# Patient Record
Sex: FEMALE | Race: WHITE
[De-identification: names, ages, dates, MRNs, and addresses within clinical notes are randomized per-mention and may not be internally consistent; named-entity substitution may affect disease eponyms.]

---

## 2021-05-13 ENCOUNTER — HOSPITAL ENCOUNTER (INPATIENT)
Dept: HOSPITAL 47 - EC | Age: 76
LOS: 1 days | Discharge: NURSING, REHAB OR LONG-TERM CARE | DRG: 563 | End: 2021-05-14
Attending: ORTHOPAEDIC SURGERY | Admitting: ORTHOPAEDIC SURGERY
Payer: MEDICARE

## 2021-05-13 DIAGNOSIS — Z98.41: ICD-10-CM

## 2021-05-13 DIAGNOSIS — Z79.1: ICD-10-CM

## 2021-05-13 DIAGNOSIS — G47.30: ICD-10-CM

## 2021-05-13 DIAGNOSIS — E03.9: ICD-10-CM

## 2021-05-13 DIAGNOSIS — I10: ICD-10-CM

## 2021-05-13 DIAGNOSIS — Z90.710: ICD-10-CM

## 2021-05-13 DIAGNOSIS — E66.01: ICD-10-CM

## 2021-05-13 DIAGNOSIS — Z87.891: ICD-10-CM

## 2021-05-13 DIAGNOSIS — Z98.42: ICD-10-CM

## 2021-05-13 DIAGNOSIS — Z87.442: ICD-10-CM

## 2021-05-13 DIAGNOSIS — Z20.822: ICD-10-CM

## 2021-05-13 DIAGNOSIS — S42.301A: Primary | ICD-10-CM

## 2021-05-13 DIAGNOSIS — E89.2: ICD-10-CM

## 2021-05-13 DIAGNOSIS — M19.90: ICD-10-CM

## 2021-05-13 DIAGNOSIS — Z79.890: ICD-10-CM

## 2021-05-13 DIAGNOSIS — E78.5: ICD-10-CM

## 2021-05-13 DIAGNOSIS — M06.9: ICD-10-CM

## 2021-05-13 DIAGNOSIS — Z96.651: ICD-10-CM

## 2021-05-13 DIAGNOSIS — Z90.49: ICD-10-CM

## 2021-05-13 DIAGNOSIS — Z79.899: ICD-10-CM
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
## 2021-05-13 DIAGNOSIS — Z79.82: ICD-10-CM

## 2021-05-13 PROCEDURE — 87635 SARS-COV-2 COVID-19 AMP PRB: CPT

## 2021-05-13 PROCEDURE — 96375 TX/PRO/DX INJ NEW DRUG ADDON: CPT

## 2021-05-13 PROCEDURE — 96376 TX/PRO/DX INJ SAME DRUG ADON: CPT

## 2021-05-13 PROCEDURE — 96374 THER/PROPH/DIAG INJ IV PUSH: CPT

## 2021-05-13 PROCEDURE — 99285 EMERGENCY DEPT VISIT HI MDM: CPT

## 2021-05-13 PROCEDURE — 94660 CPAP INITIATION&MGMT: CPT

## 2021-05-13 RX ADMIN — GABAPENTIN SCH MG: 300 CAPSULE ORAL at 20:03

## 2021-05-13 NOTE — ED
General Adult HPI





- General


Chief complaint: Extremity Injury, Upper


Stated complaint: +COVID Broken R Arm


Time Seen by Provider: 05/13/21 12:40


Source: patient, EMS, RN notes reviewed, old records reviewed


Mode of arrival: EMS





- History of Present Illness


Initial comments: 





This is a 75-year-old female who presents emergency Department from [place]

with a midshaft right humerus fracture.  The orthopedic surgeon there stated he 

could do surgery until Monday and thought the patient needed intervention so 

they sent the patient to our emergency department.  Patient states she was 

outside and tripped and fell and broke her arm patient states at that time she 

did not hit her head or neck patient denies any headache or neck pain.  Patient 

denies numbness weakness per patient denies any back pain chest pain L petitions

shortness of breath per patient denies any abdominal pain patient states she has

no other complaints besides the arm pain.





- Related Data


                                Home Medications











 Medication  Instructions  Recorded  Confirmed


 


Aspirin 81 mg PO DAILY 09/30/14 05/13/21


 


Benazepril [Lotensin] 10 mg PO DAILY 09/30/14 05/13/21


 


Levothyroxine Sodium [Synthroid] 125 mcg PO DAILY 09/30/14 05/13/21


 


Meloxicam 15 mg PO DAILY 09/30/14 05/13/21


 


Omeprazole [PriLOSEC] 20 mg PO DAILY 09/30/14 05/13/21


 


Potassium Chloride [Klor-Con 10] 10 meq PO DAILY 09/30/14 05/13/21


 


atenoloL [Tenormin] 25 mg PO BID 09/30/14 05/13/21


 


hydroCHLOROthiazide [Hydrodiuril] 12.5 mg PO DAILY 09/30/14 05/13/21


 


sulfaSALAzine [Azulfidine] 500 mg PO BID 09/30/14 05/13/21


 


Atorvastatin [Lipitor] 40 mg PO HS 05/13/21 05/13/21


 


Calcium Carbonate [Calcium] 600 mg PO BID 05/13/21 05/13/21


 


Gabapentin 600 mg PO TID 05/13/21 05/13/21


 


Montelukast [Singulair] 10 mg PO HS 05/13/21 05/13/21


 


Multivitamins, Thera [Multivitamin 1 tab PO DAILY 05/13/21 05/13/21





(formulary)]   


 


PARoxetine HCL [Paxil] 40 mg PO DAILY 05/13/21 05/13/21


 


amLODIPine [Norvasc] 10 mg PO DAILY 05/13/21 05/13/21


 


hydrALAZINE HCL [Apresoline] 25 mg PO TID 05/13/21 05/13/21











                                    Allergies











Allergy/AdvReac Type Severity Reaction Status Date / Time


 


No Known Allergies Allergy   Verified 05/13/21 13:54














Review of Systems


ROS Statement: 


Those systems with pertinent positive or pertinent negative responses have been 

documented in the HPI.





ROS Other: All systems not noted in ROS Statement are negative.





Past Medical History


Past Medical History: GERD/Reflux, Hyperlipidemia, Hypertension, Rheumatoid 

Arthritis (RA), Sleep Apnea/CPAP/BIPAP, Thyroid Disorder


Additional Past Medical History / Comment(s): CPAP @ 17 L. RENAL CALCULI.


History of Any Multi-Drug Resistant Organisms: None Reported


Past Surgical History: Cholecystectomy, Heart Catheterization, Hysterectomy, 

Joint Replacement, Orthopedic Surgery


Additional Past Surgical History / Comment(s): LEFT PAROTIDECTOMY. ORIF RIGHT 

RIGHT TOTAL KNEE. OLY. CATARACTS. CYSTO/RENAL CALCULI.


Past Anesthesia/Blood Transfusion Reactions: Motion Sickness, Postoperative 

Nausea & Vomiting (PONV)


Past Psychological History: No Psychological Hx Reported


Smoking Status: Former smoker


Past Alcohol Use History: Rare


Past Drug Use History: None Reported





- Past Family History


  ** Mother


Family Medical History: Cancer





General Exam





- General Exam Comments


Initial Comments: 





GENERAL:


Patient is well-developed and well-nourished.  Patient is nontoxic and well-

hydrated and is in mild distress.





ENT:


Neck is soft and supple.  No significant lymphadenopathy is noted.  Oropharynx 

is clear.  Moist mucous membranes.  Neck has full range of motion without 

eliciting any pain





EYES:


The sclera were anicteric and conjunctiva were pink and moist.  Extraocular 

movements were intact and pupils were equal round and reactive to light.  

Eyelids were unremarkable.





PULMONARY:


Unlabored respirations.  Good breath sounds bilaterally.  No audible rales 

rhonchi or wheezing was noted.





CARDIOVASCULAR:


There is a regular rate and rhythm without any murmurs gallops or rubs.





ABDOMEN:


Soft and nontender with normal bowel sounds. 





SKIN:


Skin is clear with no lesions or rashes and otherwise unremarkable.





NEUROLOGIC:


Patient is alert and oriented x3.  Cranial nerves II through XII are grossly 

intact.  Motor and sensory are also intact.  Normal speech, volume and content. 

 Symmetrical smile.  





MUSCULOSKELETAL:


Patient is unable to move the right arm she does have a splint in place it is a 

upper arm sugar tong splint.





LYMPHATICS:


No significant lymphadenopathy is noted





PSYCHIATRIC:


Normal psychiatric evaluation.





Course


                                   Vital Signs











  05/13/21 05/13/21





  12:37 16:38


 


Temperature 98.2 F 


 


Pulse Rate 67 70


 


Respiratory 18 18





Rate  


 


Blood Pressure 153/69 147/77


 


O2 Sat by Pulse 96 92 L





Oximetry  














Medical Decision Making





- Medical Decision Making





Patient's labs were reviewed and I reviewed the x-rays that showed a mid shaft 

fracture of the right humerus with angulation and displacement of 50%.  [facility] 

[facility] she came in and saw the patient placed the 

patient a cast and will be admitting the patient overnight.





- Lab Data


                                   Lab Results











  05/13/21 Range/Units





  13:48 


 


Coronavirus (PCR)  Not Detected  (Not Detectd)  














Disposition


Clinical Impression: 


 Right humeral fracture, Inadequate pain control





Disposition: ADMITTED AS IP TO THIS HOSP


Referrals: 


Mayco Orr MD [Primary Care Provider] - 1-2 days


Time of Disposition: 17:31

## 2021-05-13 NOTE — P.HPOR
History of Present Illness


H&P Date: 05/13/21


Chief Complaint: Right arm pain


This is a 75-year-old female who was transferred from [facility] 

today with a midshaft humerus fracture.  The patient reportedly tested positive 

for Covid in [place] and was told she would need surgery but they would be 

unable to perform the surgery.  She was transferred to [facility] today where she tested negative for Covid.  She reportedly had a fall 

yesterday and sustained injury to her right arm.  We were consulted for 

orthopedic evaluation.








Past Medical History


Past Medical History: GERD/Reflux, Hyperlipidemia, Hypertension, Rheumatoid 

Arthritis (RA), Sleep Apnea/CPAP/BIPAP, Thyroid Disorder


Additional Past Medical History / Comment(s): CPAP @ 17 L. RENAL CALCULI.


History of Any Multi-Drug Resistant Organisms: None Reported


Past Surgical History: Cholecystectomy, Heart Catheterization, Hysterectomy, 

Joint Replacement, Orthopedic Surgery


Additional Past Surgical History / Comment(s): LEFT PAROTIDECTOMY. ORIF RIGHT 

RIGHT TOTAL KNEE. OLY. CATARACTS. CYSTO/RENAL CALCULI.


Past Anesthesia/Blood Transfusion Reactions: Motion Sickness, Postoperative 

Nausea & Vomiting (PONV)


Past Psychological History: No Psychological Hx Reported


Smoking Status: Former smoker


Past Alcohol Use History: Rare


Past Drug Use History: None Reported





- Past Family History


  ** Mother


Family Medical History: Cancer





Medications and Allergies


                                Home Medications











 Medication  Instructions  Recorded  Confirmed  Type


 


Aspirin 81 mg PO DAILY 09/30/14 05/13/21 History


 


Benazepril [Lotensin] 10 mg PO DAILY 09/30/14 05/13/21 History


 


Levothyroxine Sodium [Synthroid] 125 mcg PO DAILY 09/30/14 05/13/21 History


 


Meloxicam 15 mg PO DAILY 09/30/14 05/13/21 History


 


Omeprazole [PriLOSEC] 20 mg PO DAILY 09/30/14 05/13/21 History


 


Potassium Chloride [Klor-Con 10] 10 meq PO DAILY 09/30/14 05/13/21 History


 


atenoloL [Tenormin] 25 mg PO BID 09/30/14 05/13/21 History


 


hydroCHLOROthiazide [Hydrodiuril] 12.5 mg PO DAILY 09/30/14 05/13/21 History


 


sulfaSALAzine [Azulfidine] 500 mg PO BID 09/30/14 05/13/21 History


 


Atorvastatin [Lipitor] 40 mg PO HS 05/13/21 05/13/21 History


 


Calcium Carbonate [Calcium] 600 mg PO BID 05/13/21 05/13/21 History


 


Gabapentin 600 mg PO TID 05/13/21 05/13/21 History


 


Montelukast [Singulair] 10 mg PO HS 05/13/21 05/13/21 History


 


Multivitamins, Thera [Multivitamin 1 tab PO DAILY 05/13/21 05/13/21 History





(formulary)]    


 


PARoxetine HCL [Paxil] 40 mg PO DAILY 05/13/21 05/13/21 History


 


amLODIPine [Norvasc] 10 mg PO DAILY 05/13/21 05/13/21 History


 


hydrALAZINE HCL [Apresoline] 25 mg PO TID 05/13/21 05/13/21 History








                                    Allergies











Allergy/AdvReac Type Severity Reaction Status Date / Time


 


No Known Allergies Allergy   Verified 05/13/21 13:54














Physical Examination


This is a 75-year-old morbidly obese female in no acute distress.  She is alert 

and oriented at this time.  She is very apprehensive of any motion to the right 

arm.  There is a coaptation splint in place.  Exam of the head and neck reveal 

no obvious deformity.  No pain with palpation about the cervical spine or 

paraspinal musculature.


Exam of the right upper extremity reveals significant pain with any motion of 

the arm.  She has full wrist motion without difficulty or pain.  Neurovascular 

status of her extremity is intact.


The remainder of her musculoskeletal exam is unremarkable.








Results





X-rays of the right arm revealed a midshaft transverse humerus fracture with 

mild displacement.  No other fractures identified.





Assessment and Plan


(1) Morbid obesity


Current Visit: Yes   Status: Acute   Code(s): E66.01 - MORBID (SEVERE) OBESITY 

DUE TO EXCESS CALORIES   SNOMED Code(s): 119918640


   





(2) Right humeral fracture


Current Visit: Yes   Status: Acute   Code(s): S42.301A - UNSP FRACTURE OF SHAFT 

OF HUMERUS, RIGHT ARM, INIT   SNOMED Code(s): 41245143


   


Plan: 


The clinical and x-ray findings are discussed with the patient.  Surgical versus

 conservative treatment options are discussed.  It is recommended she be treated

 with a hanging arm cast.  She is to remain at 45 elevated when in bed to allow

 the arm to hang.  We will keep her inpatient overnight for pain management.  We

 will follow-up with x-rays in 1 week.

## 2021-05-13 NOTE — P.CONS
History of Present Illness





- Reason for Consult


Consult date: 05/13/21


HTN


Requesting physician: Jesus Caldera





- Chief Complaint


Right arm pain





- History of Present Illness


Patient is a 75-year-old female with a past medical history of rheumatoid 

arthritis on sulfasalazine, hypothyroidism, resistant hypertension, and GERD who

presented as a transfer from [place] secondary to humeral fracture.  She 

was admitted to orthopedics.  We are consulted for medical management.  Plan is 

for nonoperative management with a hanging cast.





Patient seen and examined at bedside.  She states that her pain control is much 

better.  She states that the ketamine helped relieve her pain downstairs.  She 

denies any current nausea, vomiting.  She denies any recent cough, cold, fever, 

flu.  She did test positive for COVID at [facility] however tested 

negative here.  She has been asymptomatic without any recent illness and has 

received both of her vaccinations.  She was at home and does not use any 

assistive devices.





She reports that she had a trip and fall accident her son's driveway that 

resulted in her humeral fracture.  She denied any loss of consciousness, 

seizure-like activity, or presyncope.





Pertinent positives and negatives as discussed in HPI, a complete review of 

systems was performed and all other systems are negative.





General: non toxic, no distress, appears at stated age, obese


Derm:  warm, dry


Head: atraumatic, normocephalic, symmetric


Eyes: EOMI, no lid lag, anicteric sclera, pupils equal round reactive to light


ENT: Nose and ears atraumatic, no thrush,  no pharyngeal erythema


Neck: No thyromegaly, no cervical lymphadenopathy, trachea midline, supple


Mouth: no lip lesion, mucus membranes moist


Cardiovascular: S1S2 reg, no murmur, positive posterior tibial pulse bilateral, 

no edema, capillary refill less than 2 seconds


Lungs: Decreased bs bilateral, no ronchi, no rales, no wheeze, no accessory 

muscle use


Abdominal: soft,  nontender to palpation, no guarding, no appreciable 

organomegaly, normal bowel sounds


Ext: no gross muscle atrophy, Right arm in cast, moving fingers, moving b/l LE 

and left arm independently.  


Neuro:  CN II-XI grossly intact, light touch intact all 4 extremities, finger to

nose within normal limits,


Psych: Alert, oriented, appropriate affect





Patient is a 75-year-old female with humeral fracture plan is for nonoperative 

management, home in a.m. if pain is controlled.





Arthritis


-Continue sulfasalazine


-Continue with multivitamins orthopedics is in agreement





Hypertension


-Home medications resumed


-Follow blood pressures





Dyslipidemia


-Statin





Hypothyroidism


-Synthroid





Obesity with BMI 41.2


Structured outpatient weight loss 





Thank you for allowing us to participate in the care of this pleasant patient.  

Do not hesitate to contact us with questions.  Someone can be reached from the 

[facility] hospitalist group all hours of the day at [phone] or via 

perfect serve.








Past Medical History


Past Medical History: GERD/Reflux, Hyperlipidemia, Hypertension, Rheumatoid 

Arthritis (RA), Sleep Apnea/CPAP/BIPAP, Thyroid Disorder


Additional Past Medical History / Comment(s): CPAP @ 17 L. RENAL CALCULI.


History of Any Multi-Drug Resistant Organisms: None Reported


Past Surgical History: Cholecystectomy, Heart Catheterization, Hysterectomy, 

Joint Replacement, Orthopedic Surgery


Additional Past Surgical History / Comment(s): LEFT PAROTIDECTOMY. ORIF RIGHT 

RIGHT TOTAL KNEE. OLY. CATARACTS. CYSTO/RENAL CALCULI- multiple


Past Anesthesia/Blood Transfusion Reactions: Motion Sickness, Postoperative 

Nausea & Vomiting (PONV)


Past Psychological History: No Psychological Hx Reported


Additional Psychological History / Comment(s): TAKES PAXIL FOR SLEEP APNEA


Smoking Status: Former smoker


Past Alcohol Use History: Rare


Past Drug Use History: None Reported





- Past Family History


  ** Mother


Family Medical History: Cancer





Medications and Allergies


                                Home Medications











 Medication  Instructions  Recorded  Confirmed  Type


 


Aspirin 81 mg PO DAILY 09/30/14 05/13/21 History


 


Benazepril [Lotensin] 10 mg PO DAILY 09/30/14 05/13/21 History


 


Levothyroxine Sodium [Synthroid] 125 mcg PO DAILY 09/30/14 05/13/21 History


 


Meloxicam 15 mg PO DAILY 09/30/14 05/13/21 History


 


Omeprazole [PriLOSEC] 20 mg PO DAILY 09/30/14 05/13/21 History


 


Potassium Chloride [Klor-Con 10] 10 meq PO DAILY 09/30/14 05/13/21 History


 


atenoloL [Tenormin] 25 mg PO BID 09/30/14 05/13/21 History


 


hydroCHLOROthiazide [Hydrodiuril] 12.5 mg PO DAILY 09/30/14 05/13/21 History


 


sulfaSALAzine [Azulfidine] 500 mg PO BID 09/30/14 05/13/21 History


 


Atorvastatin [Lipitor] 40 mg PO HS 05/13/21 05/13/21 History


 


Calcium Carbonate [Calcium] 600 mg PO BID 05/13/21 05/13/21 History


 


Gabapentin 600 mg PO TID 05/13/21 05/13/21 History


 


Montelukast [Singulair] 10 mg PO HS 05/13/21 05/13/21 History


 


Multivitamins, Thera [Multivitamin 1 tab PO DAILY 05/13/21 05/13/21 History





(formulary)]    


 


PARoxetine HCL [Paxil] 40 mg PO DAILY 05/13/21 05/13/21 History


 


amLODIPine [Norvasc] 10 mg PO DAILY 05/13/21 05/13/21 History


 


hydrALAZINE HCL [Apresoline] 25 mg PO TID 05/13/21 05/13/21 History








                                    Allergies











Allergy/AdvReac Type Severity Reaction Status Date / Time


 


No Known Allergies Allergy   Verified 05/13/21 13:54














Physical Exam


Osteopathic Statement: *.  No significant issues noted on an osteopathic 

structural exam other than those noted in the History and Physical/Consult.


Vitals: 


                                   Vital Signs











  Temp Pulse Pulse Resp BP BP Pulse Ox


 


 05/13/21 18:52  98.2 F   65  15   175/84  90 L


 


 05/13/21 16:38   70   18  147/77   92 L


 


 05/13/21 12:37  98.2 F  67   18  153/69   96








                                Intake and Output











 05/13/21 05/13/21 05/13/21





 06:59 14:59 22:59


 


Other:   


 


  Weight  108.862 kg 108.862 kg

## 2021-05-14 VITALS
HEART RATE: 69 BPM | TEMPERATURE: 98.2 F | RESPIRATION RATE: 18 BRPM | DIASTOLIC BLOOD PRESSURE: 82 MMHG | SYSTOLIC BLOOD PRESSURE: 179 MMHG

## 2021-05-14 RX ADMIN — GABAPENTIN SCH MG: 300 CAPSULE ORAL at 08:36

## 2021-05-14 NOTE — P.PN
Subjective


Progress Note Date: 05/14/21





Patient is doing fairly well today.  She was up in the chair when I saw her.  

Her pain is well controlled.





Objective





- Vital Signs


Vital signs: 


                                   Vital Signs











Temp  98.2 F   05/14/21 07:32


 


Pulse  69   05/14/21 08:00


 


Resp  18   05/14/21 08:00


 


BP  179/82   05/14/21 07:32


 


Pulse Ox  92 L  05/14/21 01:12








                                 Intake & Output











 05/13/21 05/14/21 05/14/21





 18:59 06:59 18:59


 


Weight 108.862 kg  


 


Other:   


 


  Voiding Method Toilet  Toilet


 


  # Voids  1 














- Exam





General:  The patient is awake and alert, in no distress


Eye: there is normal conjunctiva bilaterally.  


Neck:  The neck is supple, there is no  JVD.  


Cardiovascular:  Normal  S1-S2, no S3-S4, no murmurs.


Respiratory: Lungs clear to auscultation bilaterally


Gastrointestinal: Abdomen is soft, nontender


Musculoskeletal:  There is no pedal edema.  


Neurological:. Speech is normal. 


Skin:  Skin is warm and dry 





Assessment and Plan


Assessment: 





Patient is a 75-year-old female with humeral fracture plan is for nonoperative 

management, home in a.m. if pain is controlled.





Arthritis


-Continue sulfasalazine


-Continue with multivitamins orthopedics is in agreement





Hypertension


-Home medications resumed


-Follow blood pressures





Dyslipidemia


-Statin





Hypothyroidism


-Synthroid





Obesity with BMI 41.2


Structured outpatient weight loss 





Thank you for allowing us to participate in the care of this pleasant patient.  

Discharge planning per primary

## 2021-05-14 NOTE — P.DS
Providers


Date of admission: 


05/13/21 17:31





Expected date of discharge: 05/14/21


Attending physician: 


Jesus Caldera





Consults: 





                                        





05/13/21 18:06


Consult Physician Routine 


   Consulting Provider: Marbella Vasquez


   Consult Reason/Comments: medical management


   Do you want consulting provider notified?: Yes











Primary care physician: 


Mayco Orr MD








- Discharge Diagnosis(es)


(1) Morbid obesity


Current Visit: Yes   Status: Acute   





(2) Right humeral fracture


Current Visit: Yes   Status: Acute   


Hospital Course: 


This is a 75-year-old female who was transferred from [facility] 

today with a midshaft humerus fracture.  The patient reportedly tested positive 

for Covid in [place] and was told she would need surgery but they would be 

unable to perform the surgery.  She was transferred to [facility] today where she tested negative for Covid.  She reportedly had a fall 

on 05/12/2021 and sustained injury to her right arm.  We were consulted for 

orthopedic evaluation.


Today she is doing well.  Her pain is much more controlled.  She is having 

difficulty getting up to the bathroom on her own.  She is a 1-2 person assist.  

She is right-handed and having difficulty with ADLs.  It is recommended that she

be transferred to inpatient rehab.  We will need to see her weekly for x-rays.  

She may be discharged to rehab today if cleared medically.














Plan - Discharge Summary


Discharge Rx Participant: Yes


New Discharge Prescriptions: 


New


   HYDROcodone/APAP 5-325MG [Norco 5] 1 - 2 each PO Q6HR PRN #30 tab


     PRN Reason: Pain





No Action


   Benazepril [Lotensin] 10 mg PO DAILY


   sulfaSALAzine [Azulfidine] 500 mg PO BID


   Meloxicam 15 mg PO DAILY


   Levothyroxine Sodium [Synthroid] 125 mcg PO DAILY


   hydroCHLOROthiazide [Hydrodiuril] 12.5 mg PO DAILY


   atenoloL [Tenormin] 25 mg PO BID


   Potassium Chloride [Klor-Con 10] 10 meq PO DAILY


   Omeprazole [PriLOSEC] 20 mg PO DAILY


   Aspirin 81 mg PO DAILY


   hydrALAZINE HCL [Apresoline] 25 mg PO TID


   Montelukast [Singulair] 10 mg PO HS


   Gabapentin 600 mg PO TID


   PARoxetine HCL [Paxil] 40 mg PO DAILY


   amLODIPine [Norvasc] 10 mg PO DAILY


   Multivitamins, Thera [Multivitamin (formulary)] 1 tab PO DAILY


   Calcium Carbonate [Calcium] 600 mg PO BID


   Atorvastatin [Lipitor] 40 mg PO HS


Discharge Medication List





Aspirin 81 mg PO DAILY 09/30/14 [History]


Benazepril [Lotensin] 10 mg PO DAILY 09/30/14 [History]


Levothyroxine Sodium [Synthroid] 125 mcg PO DAILY 09/30/14 [History]


Meloxicam 15 mg PO DAILY 09/30/14 [History]


Omeprazole [PriLOSEC] 20 mg PO DAILY 09/30/14 [History]


Potassium Chloride [Klor-Con 10] 10 meq PO DAILY 09/30/14 [History]


atenoloL [Tenormin] 25 mg PO BID 09/30/14 [History]


hydroCHLOROthiazide [Hydrodiuril] 12.5 mg PO DAILY 09/30/14 [History]


sulfaSALAzine [Azulfidine] 500 mg PO BID 09/30/14 [History]


Atorvastatin [Lipitor] 40 mg PO HS 05/13/21 [History]


Calcium Carbonate [Calcium] 600 mg PO BID 05/13/21 [History]


Gabapentin 600 mg PO TID 05/13/21 [History]


Montelukast [Singulair] 10 mg PO HS 05/13/21 [History]


Multivitamins, Thera [Multivitamin (formulary)] 1 tab PO DAILY 05/13/21 [Histor

y]


PARoxetine HCL [Paxil] 40 mg PO DAILY 05/13/21 [History]


amLODIPine [Norvasc] 10 mg PO DAILY 05/13/21 [History]


hydrALAZINE HCL [Apresoline] 25 mg PO TID 05/13/21 [History]


HYDROcodone/APAP 5-325MG [Norco 5] 1 - 2 each PO Q6HR PRN #30 tab 05/14/21 [Rx]








Follow up Appointment(s)/Referral(s): 


Mayco Orr MD [Primary Care Provider] - 1-2 days


Jesus Caldera MD [STAFF PHYSICIAN] - 1 Week


Activity/Diet/Wound Care/Special Instructions: 


Routine cast care right upper extremity.


Patient must remain elevated to at least a 45 angle to allow the arm to dangle.

 Do not support arm with pillow.


Discharge Disposition: TRANSFER TO SNF/ECF

## 2021-09-16 ENCOUNTER — HOSPITAL ENCOUNTER (OUTPATIENT)
Dept: HOSPITAL 47 - LABWHC1 | Age: 76
Discharge: HOME | End: 2021-09-16
Attending: ORTHOPAEDIC SURGERY
Payer: MEDICARE

## 2021-09-16 DIAGNOSIS — S42.291K: Primary | ICD-10-CM
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
## 2021-09-16 LAB
ERYTHROCYTE [DISTWIDTH] IN BLOOD BY AUTOMATED COUNT: 4.41 X 10*6/UL (ref 4.1–5.2)
ERYTHROCYTE [DISTWIDTH] IN BLOOD: 14.9 % (ref 11.5–14.5)
HBA1C MFR BLD: 5.6 % (ref 4–6)
HCT VFR BLD AUTO: 43.8 % (ref 37.2–46.3)
HGB BLD-MCNC: 14.4 G/DL (ref 12–15)
MCH RBC QN AUTO: 32.7 PG (ref 27–32)
MCHC RBC AUTO-ENTMCNC: 32.9 G/DL (ref 32–37)
MCV RBC AUTO: 99.3 FL (ref 80–97)
PLATELET # BLD AUTO: 225 X 10*3/UL (ref 140–440)
WBC # BLD AUTO: 7.77 X 10*3/UL (ref 4.5–10)

## 2021-09-16 PROCEDURE — 82306 VITAMIN D 25 HYDROXY: CPT

## 2021-09-16 PROCEDURE — 84443 ASSAY THYROID STIM HORMONE: CPT

## 2021-09-16 PROCEDURE — 85652 RBC SED RATE AUTOMATED: CPT

## 2021-09-16 PROCEDURE — 86140 C-REACTIVE PROTEIN: CPT

## 2021-09-16 PROCEDURE — 80053 COMPREHEN METABOLIC PANEL: CPT

## 2021-09-16 PROCEDURE — 83036 HEMOGLOBIN GLYCOSYLATED A1C: CPT

## 2021-09-16 PROCEDURE — 36415 COLL VENOUS BLD VENIPUNCTURE: CPT

## 2021-09-16 PROCEDURE — 85027 COMPLETE CBC AUTOMATED: CPT

## 2021-09-17 LAB
ALBUMIN SERPL-MCNC: 4.2 G/DL (ref 3.8–4.9)
ALBUMIN/GLOB SERPL: 2.1 G/DL (ref 1.6–3.17)
ALP SERPL-CCNC: 112 U/L (ref 41–126)
ALT SERPL-CCNC: 39 U/L (ref 8–44)
ANION GAP SERPL CALC-SCNC: 13.2 MMOL/L (ref 4–12)
AST SERPL-CCNC: 40 U/L (ref 13–35)
BUN SERPL-SCNC: 16 MG/DL (ref 9–27)
BUN/CREAT SERPL: 13.33 RATIO (ref 12–20)
CALCIUM SPEC-MCNC: 9.8 MG/DL (ref 8.7–10.3)
CHLORIDE SERPL-SCNC: 105 MMOL/L (ref 96–109)
CO2 SERPL-SCNC: 26.8 MMOL/L (ref 21.6–31.8)
GLOBULIN SER CALC-MCNC: 2 G/DL (ref 1.6–3.3)
GLUCOSE SERPL-MCNC: 113 MG/DL (ref 70–110)
POTASSIUM SERPL-SCNC: 3.6 MMOL/L (ref 3.5–5.5)
PROT SERPL-MCNC: 6.2 G/DL (ref 6.2–8.2)
SODIUM SERPL-SCNC: 145 MMOL/L (ref 135–145)

## 2021-09-20 ENCOUNTER — HOSPITAL ENCOUNTER (OUTPATIENT)
Dept: HOSPITAL 47 - RADCTMAIN | Age: 76
Discharge: HOME | End: 2021-09-20
Attending: ORTHOPAEDIC SURGERY
Payer: MEDICARE

## 2021-09-20 DIAGNOSIS — M84.321A: Primary | ICD-10-CM

## 2021-09-20 NOTE — CT
EXAMINATION TYPE: CT humerus RT wo con

 

DATE OF EXAM: 9/20/2021

 

COMPARISON: Radiograph 9/8/2021

 

HISTORY: 76-year-old female M84.321A,  Stress fx 4 months ago

 

TECHNIQUE: Contiguous axial scanning of the right humerus without IV contrast. Coronal and sagittal r
econstructions performed. 3-D reconstructions generated on a dedicated workstation.

 

CT DLP: 681.7 mGycm

Automated exposure control for dose reduction was used.

 

 

FINDINGS: 

Frankly angulated and distracted mid humeral shaft fracture is redemonstrated. There has been some fo
rmation of sclerosis along the fracture margin. A 5.0 x 3.5 cm area of lobulated hypodensity interpos
ed at the fracture site shows some coarse calcification/ossification is demonstrated radiographically
. Fracture appears to be angulated posteriorly and medially. Mild periostitis noted at the fracture e
nds of the bone.

 

IMPRESSION: 

 

1. TRANSVERSE MID HUMERAL SHAFT FRACTURE WITH DANIEL POSTERIOR AND MEDIAL ANGULATION. CONTINUED FOLLOW
-UP TO EXCLUDE DELAYED UNION GIVEN SOME DEVELOPING SCLEROSIS ALONG THE FRACTURE MARGINS AND CONTINUED
 SEPARATION AT THE FRACTURED ENDS OF THE BONE.

2. A 5.0 X 3.5 CM AREA OF LOBULATED HYPODENSITY INTERPOSED AT THE FRACTURE SITE SHOWS SOME HETEROTOPI
C OSSIFICATION, SUSPECTED HEMATOMA RATHER THAN MASS. FOLLOW-UP CT CAN BE CONSIDERED.